# Patient Record
Sex: FEMALE | Race: OTHER | Employment: FULL TIME | URBAN - METROPOLITAN AREA
[De-identification: names, ages, dates, MRNs, and addresses within clinical notes are randomized per-mention and may not be internally consistent; named-entity substitution may affect disease eponyms.]

---

## 2022-01-02 NOTE — TELEPHONE ENCOUNTER
Awaiting copy of negative outside result from 12/30    Outside Covid Testing done    Results and RTW guidelines:    COVID RESULT reported:      Test type:    [x] Rapid outside         [] PCR outside    Date of test: 12/30/21    Test location: [facility] instruction from hotline with [employer] results  INSTRUCTIONS PROVIDED:  [x]  Plan as noted above  []  Length of time to obtain results  []  May return to work if views negative in My Chart and  remains fever, vomiting, and diarrhea free  []  May continue to stomach []      • Additional symptoms:         Employee has positive COVID Exposure?   Yes [x]     No []      Date of exposure:  12/26/21 for both exposures   []  Coworker                       [] patient                        [x] Family/friend    PPE worn

## 2022-01-03 NOTE — ED PROVIDER NOTES
Patient Seen in: [facility]      History   Patient presents with:  Sore Throat    Stated Complaint: Sore throat    Subjective:   Well-appearing 80-year-old female presents with complaints of a sore throat for the past week.  Patient nicolas oropharyngeal erythema present. No oropharyngeal exudate or uvula swelling. Tonsils: No tonsillar exudate or tonsillar abscesses. 2+ on the right. 2+ on the left. Neck: No cervical lymphadenopathy. Supple. Normal ROM.     Heart: Regular rate and rh

## 2022-01-03 NOTE — ED INITIAL ASSESSMENT (HPI)
Pt presents to the IC with c/o a sore throat for the last week. Pt has been tested for covid with negative results.

## 2022-01-08 NOTE — TELEPHONE ENCOUNTER
Results and RTW guidelines:    COVID RESULT:    [x] Viewed by employee in [employer] RTW plan and instructions as indicated on triage call. Manager notified. Estimated RTW date: 01/06/2022  [] Discussed with employee   [] Unable to reach by phone.   Sent

## 2022-02-15 NOTE — ED INITIAL ASSESSMENT (HPI)
Pt c/o n/v and heartburn starting 22, daily vomiting after meals x5 days, pt was 6 weeks pregnant having an  on Saturday, pt c/o midepigastric burning no lower abdominal pain, vag bleeding wnl post ab

## 2022-03-24 NOTE — ED INITIAL ASSESSMENT (HPI)
Pt to ED with c/o possible allergic reaction to Sulfa antibiotic started last night. Pt states she was dx with UTI in February but did not start taking antibiotic until last night. Pt c/o generalized rash/itching. 100.4 temp at 0200 today  Pt states she took 50 mg benadryl prior to arrival.   Pt denies vomiting. Airway clear. Pt speaking in full sentences. No respiratory distress noted. 100% on room air. Pt ambulating by self with steady gait. Pt is alert and oriented x4.

## 2022-03-24 NOTE — ED QUICK NOTES
The patient is cleared for discharge per Emergency Department physician. Discharge instructions were reviewed with patient including when and how to follow up with healthcare providers and when to seek emergency care. Medication use was reviewed and prescription details were given per Emergency Department provider request. The patient dressed self and ambulated to exit with steady gait.

## 2022-04-19 NOTE — TELEPHONE ENCOUNTER
Refill passed per [employer] protocol. Requested Prescriptions   Pending Prescriptions Disp Refills    sertraline 25 MG Oral Tab 30 tablet 0     Sig: Take 1 tablet (25 mg total) by mouth daily.         Psychiatric Non-Scheduled (Anti-Anxiety) Passed - 4/19/2022  1:36 AM        Passed - Appointment in last 6 or next 3 months            Recent Outpatient Visits              4 days ago Attention deficit hyperactivity disorder (ADHD), combined type    Jose C Chapman MD    Office Visit    3 weeks ago Well adult exam    Jose C Chapman MD    Office Visit    1 month ago [address], [address], Odilia Marinelli MD    Office Visit    3 months ago Suspected COVID-19 virus infection    [facility])    Nurse Only          Future Appointments         Provider Department Appt Notes    In 1 week Jeffry Goins MD [employer], [address] [facility] Medication f/u    In 1 month Parish Langston MD [address] Depo shot next dose

## 2022-05-28 NOTE — TELEPHONE ENCOUNTER
PATIENT AWARE OF TIME CHANGE FOR SURGERY. PATIENT AGREES TO ARRIVE AT 0700 AM FOR 1130 SURGERY. Please contact the pharmacy for clarification.

## 2022-06-09 NOTE — TELEPHONE ENCOUNTER
From: Lisa Robertson  To: Cris Hamilton MD  Sent: 6/9/2022 8:40 AM CDT  Subject: Adderall refill     Hi good morning, Dr Samanta Chiu I would like to stay with the Adderall 30mg moving forward. I was not able to pick the 5mg since I had the 20mg (picked up on 5/10/22) and I needed a PA for that extra dose but I decided to just wait until my refill was due to just have the complete dose to avoid insurance issues. Can you please send a new prescription for the 30mg please? Thank you.

## 2022-08-15 NOTE — TELEPHONE ENCOUNTER
Last office visit 4/15/22  Rx pended  Future Appointments   Date Time Provider Erick Arnold   8/19/2022 12:45 PM Nikolas Gunter MD [address]           From: Dana Bradshaw  To: Amy Sharif MD  Sent: 8/14/2022  8:16 PM CDT  Subject: Medication refill    Hi, good evening. Can I please have a refill sent to my pharmacy of my Adderall 30mg? Thank you so much.

## 2022-09-06 ENCOUNTER — TELEPHONE (OUTPATIENT)
Dept: INTERNAL MEDICINE CLINIC | Facility: HOSPITAL | Age: 27
End: 2022-09-06

## 2022-09-06 ENCOUNTER — HOSPITAL ENCOUNTER (OUTPATIENT)
Age: 27
Discharge: HOME | End: 2022-09-06
Attending: EMERGENCY MEDICINE
Payer: COMMERCIAL

## 2022-09-06 ENCOUNTER — LAB ENCOUNTER (OUTPATIENT)
Dept: LAB | Age: 27
End: 2022-09-06
Attending: PREVENTIVE MEDICINE

## 2022-09-06 VITALS
TEMPERATURE: 98 F | RESPIRATION RATE: 18 BRPM | DIASTOLIC BLOOD PRESSURE: 76 MMHG | SYSTOLIC BLOOD PRESSURE: 110 MMHG | OXYGEN SATURATION: 100 % | HEART RATE: 85 BPM

## 2022-09-06 DIAGNOSIS — J06.9 VIRAL URI: Primary | ICD-10-CM

## 2022-09-06 DIAGNOSIS — Z20.822 SUSPECTED COVID-19 VIRUS INFECTION: ICD-10-CM

## 2022-09-06 DIAGNOSIS — Z20.822 SUSPECTED COVID-19 VIRUS INFECTION: Primary | ICD-10-CM

## 2022-09-06 LAB
S PYO AG THROAT QL: NEGATIVE
SARS-COV-2 RNA RESP QL NAA+PROBE: NOT DETECTED

## 2022-09-06 PROCEDURE — 99213 OFFICE O/P EST LOW 20 MIN: CPT

## 2022-09-06 PROCEDURE — 99212 OFFICE O/P EST SF 10 MIN: CPT

## 2022-09-06 PROCEDURE — 87880 STREP A ASSAY W/OPTIC: CPT

## 2022-09-06 RX ORDER — FLUTICASONE PROPIONATE 50 MCG
2 SPRAY, SUSPENSION (ML) NASAL DAILY
Qty: 16 G | Refills: 0 | Status: SHIPPED | OUTPATIENT
Start: 2022-09-06 | End: 2022-10-06

## 2022-09-06 NOTE — ED INITIAL ASSESSMENT (HPI)
Uri symptoms since Sunday, cough, runny nose, itchy throat, no fever, had - covid at employee health this am

## 2022-10-05 ENCOUNTER — PATIENT MESSAGE (OUTPATIENT)
Dept: FAMILY MEDICINE CLINIC | Facility: CLINIC | Age: 27
End: 2022-10-05

## 2022-10-05 DIAGNOSIS — F90.2 ATTENTION DEFICIT HYPERACTIVITY DISORDER (ADHD), COMBINED TYPE: ICD-10-CM

## 2022-10-06 RX ORDER — DEXTROAMPHETAMINE SACCHARATE, AMPHETAMINE ASPARTATE MONOHYDRATE, DEXTROAMPHETAMINE SULFATE AND AMPHETAMINE SULFATE 7.5; 7.5; 7.5; 7.5 MG/1; MG/1; MG/1; MG/1
30 CAPSULE, EXTENDED RELEASE ORAL EVERY MORNING
Qty: 30 CAPSULE | Refills: 0 | Status: SHIPPED | OUTPATIENT
Start: 2022-10-06 | End: 2022-11-05

## 2022-10-06 NOTE — TELEPHONE ENCOUNTER
Protocol failed or has No Protocol, please review    Routing as Dr. Brittany Gutiérrez  is out of office     Requested Prescriptions   Pending Prescriptions Disp Refills    amphetamine-dextroamphetamine ER 30 MG Oral Capsule SR 24 Hr 30 capsule 0     Sig: Take 1 capsule (30 mg total) by mouth every morning.         There is no refill protocol information for this order           Recent Outpatient Visits              1 month ago Encounter for surveillance of injectable contraceptive    [address] [facility], Renee Chaidez MD    Office Visit    4 months ago [facility] Joanne Lang Maisie Friendly, MD    Telemedicine    4 months ago Encounter for Depo-Provera contraception    [employer], [address] [facility],[address], Renee Chaidez MD    Office Visit    5 months ago Attention deficit hyperactivity disorder (ADHD), combined type    [facility] Joanne Lang Maisie Friendly, MD    Telemedicine    5 months ago Attention deficit hyperactivity disorder (ADHD), combined type    Hillary Taylor MD    Office Visit

## 2022-10-11 ENCOUNTER — IMMUNIZATION (OUTPATIENT)
Dept: LAB | Facility: HOSPITAL | Age: 27
End: 2022-10-11

## 2022-10-11 DIAGNOSIS — Z23 NEED FOR VACCINATION: Primary | ICD-10-CM

## 2022-10-11 PROCEDURE — 90471 IMMUNIZATION ADMIN: CPT

## 2022-10-20 ENCOUNTER — PATIENT MESSAGE (OUTPATIENT)
Dept: FAMILY MEDICINE CLINIC | Facility: CLINIC | Age: 27
End: 2022-10-20

## 2022-10-20 DIAGNOSIS — F90.2 ATTENTION DEFICIT HYPERACTIVITY DISORDER (ADHD), COMBINED TYPE: ICD-10-CM

## 2022-10-20 RX ORDER — DEXTROAMPHETAMINE SACCHARATE, AMPHETAMINE ASPARTATE MONOHYDRATE, DEXTROAMPHETAMINE SULFATE AND AMPHETAMINE SULFATE 7.5; 7.5; 7.5; 7.5 MG/1; MG/1; MG/1; MG/1
30 CAPSULE, EXTENDED RELEASE ORAL EVERY MORNING
Qty: 30 CAPSULE | Refills: 0 | Status: SHIPPED | OUTPATIENT
Start: 2022-10-20 | End: 2022-11-19

## 2022-11-27 ENCOUNTER — E-VISIT (OUTPATIENT)
Dept: TELEHEALTH | Age: 27
End: 2022-11-27

## 2022-11-27 ENCOUNTER — PATIENT MESSAGE (OUTPATIENT)
Dept: FAMILY MEDICINE CLINIC | Facility: CLINIC | Age: 27
End: 2022-11-27

## 2022-11-27 DIAGNOSIS — Z02.9 ADMINISTRATIVE ENCOUNTER: Primary | ICD-10-CM

## 2022-11-28 ENCOUNTER — HOSPITAL ENCOUNTER (OUTPATIENT)
Age: 27
Discharge: HOME | End: 2022-11-28
Attending: EMERGENCY MEDICINE
Payer: COMMERCIAL

## 2022-11-28 ENCOUNTER — TELEPHONE (OUTPATIENT)
Dept: FAMILY MEDICINE CLINIC | Facility: CLINIC | Age: 27
End: 2022-11-28

## 2022-11-28 VITALS
TEMPERATURE: 99 F | RESPIRATION RATE: 18 BRPM | SYSTOLIC BLOOD PRESSURE: 102 MMHG | DIASTOLIC BLOOD PRESSURE: 60 MMHG | OXYGEN SATURATION: 98 % | HEART RATE: 102 BPM

## 2022-11-28 DIAGNOSIS — J02.0 STREP PHARYNGITIS: Primary | ICD-10-CM

## 2022-11-28 LAB — S PYO AG THROAT QL: POSITIVE

## 2022-11-28 PROCEDURE — 87880 STREP A ASSAY W/OPTIC: CPT

## 2022-11-28 PROCEDURE — 99213 OFFICE O/P EST LOW 20 MIN: CPT

## 2022-11-28 RX ORDER — AZITHROMYCIN 250 MG/1
TABLET, FILM COATED ORAL
Qty: 6 TABLET | Refills: 0 | Status: SHIPPED | OUTPATIENT
Start: 2022-11-28 | End: 2022-11-28

## 2022-11-28 RX ORDER — AZITHROMYCIN 250 MG/1
TABLET, FILM COATED ORAL
Qty: 6 TABLET | Refills: 0 | Status: SHIPPED | OUTPATIENT
Start: 2022-11-28 | End: 2022-12-03

## 2022-11-29 NOTE — PROGRESS NOTES
Patient seen for sore throat at the [facility] 224 cancelled with no charge.

## 2022-12-02 ENCOUNTER — PATIENT MESSAGE (OUTPATIENT)
Dept: FAMILY MEDICINE CLINIC | Facility: CLINIC | Age: 27
End: 2022-12-02

## 2022-12-02 RX ORDER — DEXTROAMPHETAMINE SACCHARATE, AMPHETAMINE ASPARTATE MONOHYDRATE, DEXTROAMPHETAMINE SULFATE AND AMPHETAMINE SULFATE 7.5; 7.5; 7.5; 7.5 MG/1; MG/1; MG/1; MG/1
30 CAPSULE, EXTENDED RELEASE ORAL DAILY
Qty: 30 CAPSULE | Refills: 0 | Status: SHIPPED | OUTPATIENT
Start: 2023-02-02 | End: 2023-03-04

## 2022-12-02 RX ORDER — DEXTROAMPHETAMINE SACCHARATE, AMPHETAMINE ASPARTATE MONOHYDRATE, DEXTROAMPHETAMINE SULFATE AND AMPHETAMINE SULFATE 7.5; 7.5; 7.5; 7.5 MG/1; MG/1; MG/1; MG/1
30 CAPSULE, EXTENDED RELEASE ORAL DAILY
Qty: 30 CAPSULE | Refills: 0 | Status: SHIPPED | OUTPATIENT
Start: 2023-01-02 | End: 2023-02-01

## 2022-12-02 RX ORDER — DEXTROAMPHETAMINE SACCHARATE, AMPHETAMINE ASPARTATE MONOHYDRATE, DEXTROAMPHETAMINE SULFATE AND AMPHETAMINE SULFATE 7.5; 7.5; 7.5; 7.5 MG/1; MG/1; MG/1; MG/1
30 CAPSULE, EXTENDED RELEASE ORAL DAILY
Qty: 30 CAPSULE | Refills: 0 | Status: SHIPPED | OUTPATIENT
Start: 2022-12-02 | End: 2023-01-01

## 2022-12-09 ENCOUNTER — OFFICE VISIT (OUTPATIENT)
Dept: FAMILY MEDICINE CLINIC | Facility: CLINIC | Age: 27
End: 2022-12-09
Payer: COMMERCIAL

## 2022-12-09 VITALS
BODY MASS INDEX: 19.69 KG/M2 | HEIGHT: 66 IN | RESPIRATION RATE: 16 BRPM | HEART RATE: 106 BPM | WEIGHT: 122.5 LBS | DIASTOLIC BLOOD PRESSURE: 69 MMHG | SYSTOLIC BLOOD PRESSURE: 101 MMHG

## 2022-12-09 DIAGNOSIS — F90.2 ATTENTION DEFICIT HYPERACTIVITY DISORDER (ADHD), COMBINED TYPE: ICD-10-CM

## 2022-12-09 DIAGNOSIS — G47.00 INSOMNIA, UNSPECIFIED TYPE: Primary | ICD-10-CM

## 2022-12-09 PROCEDURE — 3078F DIAST BP <80 MM HG: CPT | Performed by: STUDENT IN AN ORGANIZED HEALTH CARE EDUCATION/TRAINING PROGRAM

## 2022-12-09 PROCEDURE — 99214 OFFICE O/P EST MOD 30 MIN: CPT | Performed by: STUDENT IN AN ORGANIZED HEALTH CARE EDUCATION/TRAINING PROGRAM

## 2022-12-09 PROCEDURE — 3074F SYST BP LT 130 MM HG: CPT | Performed by: STUDENT IN AN ORGANIZED HEALTH CARE EDUCATION/TRAINING PROGRAM

## 2022-12-09 PROCEDURE — 3008F BODY MASS INDEX DOCD: CPT | Performed by: STUDENT IN AN ORGANIZED HEALTH CARE EDUCATION/TRAINING PROGRAM

## 2022-12-09 RX ORDER — ALPRAZOLAM 0.25 MG/1
0.25 TABLET ORAL NIGHTLY PRN
Qty: 30 TABLET | Refills: 0 | Status: SHIPPED | OUTPATIENT
Start: 2022-12-09

## 2023-01-31 ENCOUNTER — PATIENT MESSAGE (OUTPATIENT)
Dept: FAMILY MEDICINE CLINIC | Facility: CLINIC | Age: 28
End: 2023-01-31

## 2023-02-01 RX ORDER — ALPRAZOLAM 0.5 MG/1
0.5 TABLET ORAL NIGHTLY PRN
Qty: 30 TABLET | Refills: 0 | Status: SHIPPED | OUTPATIENT
Start: 2023-02-01

## 2023-02-26 ENCOUNTER — PATIENT MESSAGE (OUTPATIENT)
Dept: FAMILY MEDICINE CLINIC | Facility: CLINIC | Age: 28
End: 2023-02-26

## 2023-02-26 RX ORDER — DEXTROAMPHETAMINE SACCHARATE, AMPHETAMINE ASPARTATE MONOHYDRATE, DEXTROAMPHETAMINE SULFATE AND AMPHETAMINE SULFATE 7.5; 7.5; 7.5; 7.5 MG/1; MG/1; MG/1; MG/1
30 CAPSULE, EXTENDED RELEASE ORAL DAILY
Qty: 30 CAPSULE | Refills: 0 | Status: SHIPPED | OUTPATIENT
Start: 2023-02-26 | End: 2023-03-28

## 2023-02-26 NOTE — TELEPHONE ENCOUNTER
No protocol for requested medication. Please advise on refill request.      Requested Prescriptions     Pending Prescriptions Disp Refills    amphetamine-dextroamphetamine ER (ADDERALL XR) 30 MG Oral Capsule SR 24 Hr 30 capsule 0     Sig: Take 1 capsule (30 mg total) by mouth daily. Recent Visits  Date Type Provider Dept   12/09/22 Office Visit Watson Mcdaniel MD [facility]   04/15/22 Office Visit Watson Mcdaniel MD [facility]   03/25/22 Office Visit Watson Mcdaniel MD [facility]   Showing recent visits within past 540 days with a meds authorizing provider and meeting all other requirements  Future Appointments  No visits were found meeting these conditions. Showing future appointments within next 150 days with a meds authorizing provider and meeting all other requirements     Requested Prescriptions   Pending Prescriptions Disp Refills    amphetamine-dextroamphetamine ER (ADDERALL XR) 30 MG Oral Capsule SR 24 Hr 30 capsule 0     Sig: Take 1 capsule (30 mg total) by mouth daily.        There is no refill protocol information for this order            Recent Outpatient Visits              2 months ago Insomnia, unspecified type    Omar Murphy MD    Office Visit    3 months ago Administrative encounter    [address], Virtual Visit KIKA Wills    E-Visit    6 months ago Encounter for surveillance of injectable contraceptive    [address] Beulah Chaidez MD    Office Visit    9 months ago Attention deficit hyperactivity disorder (ADHD), combined type    Joanne Guzmán Maisie Friendly, MD    Telemedicine    9 months ago Encounter for Depo-Provera contraception    [address], [address] [facility],[address], [address] Beulah Chaidez MD    Office Visit

## 2023-02-27 NOTE — TELEPHONE ENCOUNTER
Triage Support, can you please assist with prior authorization. Spoke to [employer]. Adderall XR is not covered under her insurance will need prior authorization. Patient has Medicaid insurance now. Fax for prior authorization is going to be sent to 's office.

## 2023-03-17 PROBLEM — F90.2 ATTENTION DEFICIT HYPERACTIVITY DISORDER (ADHD), COMBINED TYPE: Status: ACTIVE | Noted: 2023-03-17

## (undated) NOTE — LETTER
Date & Time: 2/16/2022, 9:15 AM  Patient: Jaky Blum  Encounter Provider(s):    KIKA Harrington       To Whom It May Concern:    Jaky Blum was seen and treated in our department on 2/15/2022. She can return to work on Thursday, February 17, 2022 as long as her symptoms have improved. If you have any questions or concerns, please do not hesitate to call.         KIKA Plata

## (undated) NOTE — LETTER
Date & Time: 8/29/2022, 11:47 AM  Patient: Asya Zhang  Encounter Provider(s):    Sherrlyn Shone, MD       To Whom It May Concern:    Asya Zhang was seen and treated in our department on 8/29/2022. She should not return to work until Left eye redness has resolved.     If you have any questions or concerns, please do not hesitate to call.        _____________________________  Physician/[facility] Signature